# Patient Record
Sex: FEMALE
[De-identification: names, ages, dates, MRNs, and addresses within clinical notes are randomized per-mention and may not be internally consistent; named-entity substitution may affect disease eponyms.]

---

## 2018-03-08 ENCOUNTER — HOSPITAL ENCOUNTER (OUTPATIENT)
Dept: HOSPITAL 5 - SPVIMAG | Age: 68
Discharge: HOME | End: 2018-03-08
Attending: ORTHOPAEDIC SURGERY
Payer: MEDICARE

## 2018-03-08 DIAGNOSIS — I70.202: ICD-10-CM

## 2018-03-08 DIAGNOSIS — M25.552: Primary | ICD-10-CM

## 2018-03-08 DIAGNOSIS — M25.551: ICD-10-CM

## 2018-03-08 DIAGNOSIS — Z96.643: ICD-10-CM

## 2018-03-08 PROCEDURE — 73521 X-RAY EXAM HIPS BI 2 VIEWS: CPT

## 2018-03-08 NOTE — XRAY REPORT
FINAL REPORT



EXAM:  XR HIPS BILAT 2V W/PELVIS



HISTORY:  BILATERAL HIP PAIN 



TECHNIQUE:  Left hip and AP pelvis three views 



PRIORS:  None.



FINDINGS:  

Bilateral hip prosthesis are present. 



No acute fractures are identified. No dislocation seen. The left

prosthesis appears intact. There is a small nonunited greater

tuberosity fragment present. Bony pelvis appears intact. Noted

are bilateral common iliac arterial stents. There is a

neurostimulator device noted power pack overlying the left

hemipelvis. 



IMPRESSION:  

Left hip prosthesis appears intact. There is a nonunited greater

tuberosity fragment which appears most likely chronic.

Correlation with any prior studies if these could be obtained

would be helpful for further evaluation 



Atherosclerosis with aortoiliac stents present neurostimulator

device noted overlying the left hemipelvis.